# Patient Record
Sex: MALE | Race: WHITE | Employment: FULL TIME | URBAN - METROPOLITAN AREA
[De-identification: names, ages, dates, MRNs, and addresses within clinical notes are randomized per-mention and may not be internally consistent; named-entity substitution may affect disease eponyms.]

---

## 2017-01-16 PROBLEM — Z86.711 HX OF PULMONARY EMBOLUS: Status: ACTIVE | Noted: 2017-01-16

## 2017-01-16 PROBLEM — Z86.718 HISTORY OF DVT OF LOWER EXTREMITY: Status: ACTIVE | Noted: 2017-01-16

## 2017-01-16 PROBLEM — Z98.890 HISTORY OF BACK SURGERY: Status: ACTIVE | Noted: 2017-01-16

## 2017-09-26 PROBLEM — I82.532 CHRONIC DEEP VEIN THROMBOSIS (DVT) OF POPLITEAL VEIN OF LEFT LOWER EXTREMITY (HCC): Status: ACTIVE | Noted: 2017-09-26

## 2018-04-13 ENCOUNTER — HOSPITAL ENCOUNTER (OUTPATIENT)
Age: 58
Discharge: HOME | End: 2018-04-15
Payer: COMMERCIAL

## 2018-04-13 DIAGNOSIS — D50.0 IRON DEFICIENCY ANEMIA DUE TO CHRONIC BLOOD LOSS: ICD-10-CM

## 2018-04-13 LAB
BASOPHILS ABSOLUTE: 0.05 E9/L (ref 0–0.2)
BASOPHILS RELATIVE PERCENT: 1.1 % (ref 0–2)
EOSINOPHILS ABSOLUTE: 0.16 E9/L (ref 0.05–0.5)
EOSINOPHILS RELATIVE PERCENT: 3.6 % (ref 0–6)
FERRITIN: 86 NG/ML
HCT VFR BLD CALC: 43.8 % (ref 37–54)
HEMOGLOBIN: 12.8 G/DL (ref 12.5–16.5)
IMMATURE GRANULOCYTES #: 0.01 E9/L
IMMATURE GRANULOCYTES %: 0.2 % (ref 0–5)
IRON SATURATION: 33 % (ref 20–55)
IRON: 114 MCG/DL (ref 59–158)
LYMPHOCYTES ABSOLUTE: 1.51 E9/L (ref 1.5–4)
LYMPHOCYTES RELATIVE PERCENT: 33.7 % (ref 20–42)
MCH RBC QN AUTO: 27.4 PG (ref 26–35)
MCHC RBC AUTO-ENTMCNC: 29.2 % (ref 32–34.5)
MCV RBC AUTO: 93.8 FL (ref 80–99.9)
MONOCYTES ABSOLUTE: 0.6 E9/L (ref 0.1–0.95)
MONOCYTES RELATIVE PERCENT: 13.4 % (ref 2–12)
NEUTROPHILS ABSOLUTE: 2.15 E9/L (ref 1.8–7.3)
NEUTROPHILS RELATIVE PERCENT: 48 % (ref 43–80)
PDW BLD-RTO: 17.5 FL (ref 11.5–15)
PLATELET # BLD: 205 E9/L (ref 130–450)
PMV BLD AUTO: 11 FL (ref 7–12)
RBC # BLD: 4.67 E12/L (ref 3.8–5.8)
TOTAL IRON BINDING CAPACITY: 344 MCG/DL (ref 250–450)
WBC # BLD: 4.5 E9/L (ref 4.5–11.5)

## 2018-04-13 PROCEDURE — 83540 ASSAY OF IRON: CPT

## 2018-04-13 PROCEDURE — 83550 IRON BINDING TEST: CPT

## 2018-04-13 PROCEDURE — 85025 COMPLETE CBC W/AUTO DIFF WBC: CPT

## 2018-04-13 PROCEDURE — 82728 ASSAY OF FERRITIN: CPT

## 2018-07-20 ENCOUNTER — HOSPITAL ENCOUNTER (OUTPATIENT)
Age: 58
Discharge: HOME | End: 2018-07-22
Payer: COMMERCIAL

## 2018-07-20 DIAGNOSIS — D50.0 IRON DEFICIENCY ANEMIA DUE TO CHRONIC BLOOD LOSS: ICD-10-CM

## 2018-07-20 LAB
ALBUMIN SERPL-MCNC: 4.3 G/DL (ref 3.5–5.2)
ALP BLD-CCNC: 67 U/L (ref 40–129)
ALT SERPL-CCNC: 18 U/L (ref 0–40)
ANION GAP SERPL CALCULATED.3IONS-SCNC: 12 MMOL/L (ref 7–16)
AST SERPL-CCNC: 24 U/L (ref 0–39)
BASOPHILS ABSOLUTE: 0.02 E9/L (ref 0–0.2)
BASOPHILS RELATIVE PERCENT: 0.4 % (ref 0–2)
BILIRUB SERPL-MCNC: 0.4 MG/DL (ref 0–1.2)
BUN BLDV-MCNC: 15 MG/DL (ref 6–20)
CALCIUM SERPL-MCNC: 9.1 MG/DL (ref 8.6–10.2)
CHLORIDE BLD-SCNC: 103 MMOL/L (ref 98–107)
CO2: 27 MMOL/L (ref 22–29)
CREAT SERPL-MCNC: 1 MG/DL (ref 0.7–1.2)
EOSINOPHILS ABSOLUTE: 0.12 E9/L (ref 0.05–0.5)
EOSINOPHILS RELATIVE PERCENT: 2.6 % (ref 0–6)
GFR AFRICAN AMERICAN: >60
GFR NON-AFRICAN AMERICAN: >60 ML/MIN/1.73
GLUCOSE BLD-MCNC: 70 MG/DL (ref 74–109)
HCT VFR BLD CALC: 46.7 % (ref 37–54)
HEMOGLOBIN: 14.5 G/DL (ref 12.5–16.5)
IMMATURE GRANULOCYTES #: 0.01 E9/L
IMMATURE GRANULOCYTES %: 0.2 % (ref 0–5)
LYMPHOCYTES ABSOLUTE: 1.44 E9/L (ref 1.5–4)
LYMPHOCYTES RELATIVE PERCENT: 31.8 % (ref 20–42)
MCH RBC QN AUTO: 28.8 PG (ref 26–35)
MCHC RBC AUTO-ENTMCNC: 31 % (ref 32–34.5)
MCV RBC AUTO: 92.8 FL (ref 80–99.9)
MONOCYTES ABSOLUTE: 0.59 E9/L (ref 0.1–0.95)
MONOCYTES RELATIVE PERCENT: 13 % (ref 2–12)
NEUTROPHILS ABSOLUTE: 2.35 E9/L (ref 1.8–7.3)
NEUTROPHILS RELATIVE PERCENT: 52 % (ref 43–80)
PDW BLD-RTO: 12.7 FL (ref 11.5–15)
PLATELET # BLD: 165 E9/L (ref 130–450)
PMV BLD AUTO: 11.3 FL (ref 7–12)
POTASSIUM SERPL-SCNC: 4.3 MMOL/L (ref 3.5–5)
RBC # BLD: 5.03 E12/L (ref 3.8–5.8)
SODIUM BLD-SCNC: 142 MMOL/L (ref 132–146)
TOTAL PROTEIN: 6.9 G/DL (ref 6.4–8.3)
WBC # BLD: 4.5 E9/L (ref 4.5–11.5)

## 2018-07-20 PROCEDURE — 80053 COMPREHEN METABOLIC PANEL: CPT

## 2018-07-20 PROCEDURE — 85025 COMPLETE CBC W/AUTO DIFF WBC: CPT

## 2018-09-07 ENCOUNTER — HOSPITAL ENCOUNTER (OUTPATIENT)
Dept: ULTRASOUND IMAGING | Age: 58
Discharge: HOME | End: 2018-09-09
Payer: COMMERCIAL

## 2018-09-07 DIAGNOSIS — I82.532 CHRONIC DEEP VEIN THROMBOSIS (DVT) OF POPLITEAL VEIN OF LEFT LOWER EXTREMITY (HCC): ICD-10-CM

## 2018-09-07 PROCEDURE — 93971 EXTREMITY STUDY: CPT

## 2018-10-26 ENCOUNTER — HOSPITAL ENCOUNTER (OUTPATIENT)
Age: 58
Discharge: HOME | End: 2018-10-28
Payer: COMMERCIAL

## 2018-10-26 DIAGNOSIS — D50.0 IRON DEFICIENCY ANEMIA DUE TO CHRONIC BLOOD LOSS: ICD-10-CM

## 2018-10-26 LAB
BASOPHILS ABSOLUTE: 0.03 E9/L (ref 0–0.2)
BASOPHILS RELATIVE PERCENT: 0.6 % (ref 0–2)
EOSINOPHILS ABSOLUTE: 0.11 E9/L (ref 0.05–0.5)
EOSINOPHILS RELATIVE PERCENT: 2.4 % (ref 0–6)
HCT VFR BLD CALC: 46.1 % (ref 37–54)
HEMOGLOBIN: 14.1 G/DL (ref 12.5–16.5)
IMMATURE GRANULOCYTES #: 0.01 E9/L
IMMATURE GRANULOCYTES %: 0.2 % (ref 0–5)
LYMPHOCYTES ABSOLUTE: 1.51 E9/L (ref 1.5–4)
LYMPHOCYTES RELATIVE PERCENT: 32.3 % (ref 20–42)
MCH RBC QN AUTO: 27.9 PG (ref 26–35)
MCHC RBC AUTO-ENTMCNC: 30.6 % (ref 32–34.5)
MCV RBC AUTO: 91.3 FL (ref 80–99.9)
MONOCYTES ABSOLUTE: 0.64 E9/L (ref 0.1–0.95)
MONOCYTES RELATIVE PERCENT: 13.7 % (ref 2–12)
NEUTROPHILS ABSOLUTE: 2.37 E9/L (ref 1.8–7.3)
NEUTROPHILS RELATIVE PERCENT: 50.8 % (ref 43–80)
PDW BLD-RTO: 12.8 FL (ref 11.5–15)
PLATELET # BLD: 177 E9/L (ref 130–450)
PMV BLD AUTO: 11.6 FL (ref 7–12)
RBC # BLD: 5.05 E12/L (ref 3.8–5.8)
WBC # BLD: 4.7 E9/L (ref 4.5–11.5)

## 2018-10-26 PROCEDURE — 85025 COMPLETE CBC W/AUTO DIFF WBC: CPT

## 2019-01-08 ENCOUNTER — HOSPITAL ENCOUNTER (OUTPATIENT)
Age: 59
Discharge: HOME | End: 2019-01-08
Payer: COMMERCIAL

## 2019-01-08 DIAGNOSIS — K62.5 RECTAL BLEEDING: ICD-10-CM

## 2019-01-08 LAB
BASOPHILS ABSOLUTE: 0.03 E9/L (ref 0–0.2)
BASOPHILS RELATIVE PERCENT: 0.8 % (ref 0–2)
EOSINOPHILS ABSOLUTE: 0.1 E9/L (ref 0.05–0.5)
EOSINOPHILS RELATIVE PERCENT: 2.5 % (ref 0–6)
HCT VFR BLD CALC: 39.4 % (ref 37–54)
HEMOGLOBIN: 12.4 G/DL (ref 12.5–16.5)
IMMATURE GRANULOCYTES #: 0.02 E9/L
IMMATURE GRANULOCYTES %: 0.5 % (ref 0–5)
LYMPHOCYTES ABSOLUTE: 1.23 E9/L (ref 1.5–4)
LYMPHOCYTES RELATIVE PERCENT: 30.9 % (ref 20–42)
MCH RBC QN AUTO: 27.4 PG (ref 26–35)
MCHC RBC AUTO-ENTMCNC: 31.5 % (ref 32–34.5)
MCV RBC AUTO: 87.2 FL (ref 80–99.9)
MONOCYTES ABSOLUTE: 0.59 E9/L (ref 0.1–0.95)
MONOCYTES RELATIVE PERCENT: 14.8 % (ref 2–12)
NEUTROPHILS ABSOLUTE: 2.01 E9/L (ref 1.8–7.3)
NEUTROPHILS RELATIVE PERCENT: 50.5 % (ref 43–80)
PDW BLD-RTO: 14.1 FL (ref 11.5–15)
PLATELET # BLD: 216 E9/L (ref 130–450)
PMV BLD AUTO: 10.8 FL (ref 7–12)
RBC # BLD: 4.52 E12/L (ref 3.8–5.8)
WBC # BLD: 4 E9/L (ref 4.5–11.5)

## 2019-01-08 PROCEDURE — 36415 COLL VENOUS BLD VENIPUNCTURE: CPT

## 2019-01-08 PROCEDURE — 85025 COMPLETE CBC W/AUTO DIFF WBC: CPT

## 2019-03-05 ENCOUNTER — OFFICE VISIT (OUTPATIENT)
Dept: VASCULAR SURGERY | Age: 59
End: 2019-03-05
Payer: COMMERCIAL

## 2019-03-05 ENCOUNTER — HOSPITAL ENCOUNTER (OUTPATIENT)
Dept: CARDIOLOGY | Age: 59
Discharge: HOME | End: 2019-03-05
Payer: COMMERCIAL

## 2019-03-05 DIAGNOSIS — I72.8 ANEURYSM OF CELIAC ARTERY (HCC): Primary | ICD-10-CM

## 2019-03-05 DIAGNOSIS — I72.8 ANEURYSM OF CELIAC ARTERY (HCC): ICD-10-CM

## 2019-03-05 PROCEDURE — 93978 VASCULAR STUDY: CPT

## 2019-03-05 PROCEDURE — 99213 OFFICE O/P EST LOW 20 MIN: CPT | Performed by: SURGERY

## 2020-08-11 ENCOUNTER — OFFICE VISIT (OUTPATIENT)
Dept: VASCULAR SURGERY | Age: 60
End: 2020-08-11
Payer: COMMERCIAL

## 2020-08-11 ENCOUNTER — HOSPITAL ENCOUNTER (OUTPATIENT)
Dept: CARDIOLOGY | Age: 60
Discharge: HOME | End: 2020-08-11
Payer: COMMERCIAL

## 2020-08-11 VITALS — BODY MASS INDEX: 25.73 KG/M2 | RESPIRATION RATE: 16 BRPM | HEIGHT: 72 IN | WEIGHT: 190 LBS

## 2020-08-11 PROCEDURE — 93978 VASCULAR STUDY: CPT

## 2020-08-11 PROCEDURE — 99213 OFFICE O/P EST LOW 20 MIN: CPT | Performed by: PHYSICIAN ASSISTANT

## 2020-08-11 RX ORDER — LATANOPROST 50 UG/ML
1 SOLUTION/ DROPS OPHTHALMIC NIGHTLY
COMMUNITY
End: 2021-06-28

## 2020-08-11 NOTE — PROGRESS NOTES
Vascular Surgery Outpatient Progress Note      Chief Complaint   Patient presents with    Follow-up     AAA       HISTORY OF PRESENT ILLNESS:                The patient is a 61 y.o. 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 He denies any recent hospital admission, major illness, or surgery since the last office visit. Denies new abd pain or back pain. He does have chronic back pain. 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 He states he has chronic L leg swelling with mild discomfort when he is on his feet for a long period of time. He does not wear compression stockings as they are uncomfortable and he states it is not bothersome enough to wear them at this point. Past Medical History:        Diagnosis Date    Blood clot in vein     Celiac artery aneurysm (HCC)     Leg pain     Pulmonary embolism (HCC)      Past Surgical History:        Procedure Laterality Date    BACK SURGERY      COLONOSCOPY  03/09/2015    NORMAL- JEROMY    FINGER SURGERY       Current Medications:   Prior to Admission medications    Medication Sig Start Date End Date Taking? Authorizing Provider   latanoprost (XALATAN) 0.005 % ophthalmic solution 1 drop nightly   Yes Historical Provider, MD   hydrocortisone (ANUSOL-HC) 2.5 % rectal cream Place rectally 2 times daily. 12/16/19  Yes Murray Cunningham MD   ferrous sulfate 325 (65 Fe) MG tablet Take 325 mg by mouth as needed    Yes Historical Provider, MD   docusate sodium (COLACE) 100 MG capsule Take 100 mg by mouth 2 times daily   Yes Historical Provider, MD   Psyllium (METAMUCIL PO) Take 1 tablet by mouth as needed. Yes Historical Provider, MD   rivaroxaban (XARELTO) 20 MG TABS tablet Take 20 mg by mouth daily (with breakfast)    Historical Provider, MD     Allergies:  Patient has no known allergies.     Social History     Socioeconomic History    Marital status:      Spouse name: Not on file    Number of children: Not on file    Years of education: Not on file    Highest education level: Not on file   Occupational History    Not on file   Social Needs    Financial resource strain: Not on file    Food insecurity     Worry: Not on file     Inability: Not on file    Transportation needs     Medical: Not on file     Non-medical: Not on file   Tobacco Use    Smoking status: Never Smoker    Smokeless tobacco: Never Used   Substance and Sexual Activity    Alcohol use: Yes     Comment: rarely drinks wine/ 2-3 cups of coffee daily    Drug use: No    Sexual activity: Yes     Partners: Female   Lifestyle    Physical activity     Days per week: Not on file     Minutes per session: Not on file    Stress: Not on file   Relationships    Social connections     Talks on phone: Not on file     Gets together: Not on file     Attends Quaker service: Not on file     Active member of club or organization: Not on file     Attends meetings of clubs or organizations: Not on file     Relationship status: Not on file    Intimate partner violence     Fear of current or ex partner: Not on file     Emotionally abused: Not on file     Physically abused: Not on file     Forced sexual activity: Not on file   Other Topics Concern    Not on file   Social History Narrative    Not on file        Family History   Problem Relation Age of Onset    Other Mother     Heart Disease Father        REVIEW OF SYSTEMS (New symptoms):    Eyes:      Blurred vision:  No [x]/Yes []               Diplopia:   No [x]/Yes []               Vision loss:       No [x]/Yes []   Ears, nose, throat:             Hearing loss:    No [x]/Yes []      Vertigo:   No [x]/Yes []                       Swallowing problem:  No [x]/Yes []               Nose bleeds:   No [x]/Yes []      Voice hoarseness:  No [x]/Yes []  Respiratory:             Cough:   No [x]/Yes []      Pleuritic chest pain:  No [x]/Yes []                        Dyspnea:   No [x]/Yes []      Wheezing: leg edema bilaterally    PULSE EXAM      Right      Left   Brachial     Radial 3 3   Femoral     Popliteal     Dorsalis Pedis     Posterior Tibial 3 3   (3=normal, 2=diminished, 1=barely palpable, 4=widened)    RADIOLOGY: [facility] today    Celiac artery 2.0 cm  Distal Abdominal Aorta: 2.3 cm    Problem List Items Addressed This Visit        Vascular Problems    Aneurysm of celiac artery (HCC) - Primary    Relevant Orders    US DUPLEX SCAN OF AORTA    Chronic deep vein thrombosis (DVT) of popliteal vein of left lower extremity ([address]        I reviewed the results of the ultrasound with the patient. Stable celiac artery aneurysm. No need for surgical intervention at this time. I asked him to remain on xarelto and to take it with food. I reviewed that the patient can continue with all normal activities. I reviewed s/s of rupture and instructions should this occur. I will plan to see him back in one year with another ultrasound. Pt seen and plan reviewed with Dr. Sheela Severs. Maria A Mason PA-C    Return in about 1 year (around 8/11/2021).

## 2021-06-28 DIAGNOSIS — Z00.00 ROUTINE GENERAL MEDICAL EXAMINATION AT A HEALTH CARE FACILITY: ICD-10-CM

## 2021-06-28 LAB
ALBUMIN SERPL-MCNC: 4.2 G/DL (ref 3.5–5.2)
ALP BLD-CCNC: 72 U/L (ref 40–129)
ALT SERPL-CCNC: 16 U/L (ref 0–40)
ANION GAP SERPL CALCULATED.3IONS-SCNC: 10 MMOL/L (ref 7–16)
AST SERPL-CCNC: 23 U/L (ref 0–39)
BASOPHILS ABSOLUTE: 0.03 E9/L (ref 0–0.2)
BASOPHILS RELATIVE PERCENT: 0.7 % (ref 0–2)
BILIRUB SERPL-MCNC: 0.7 MG/DL (ref 0–1.2)
BUN BLDV-MCNC: 16 MG/DL (ref 6–23)
CALCIUM SERPL-MCNC: 9.1 MG/DL (ref 8.6–10.2)
CHLORIDE BLD-SCNC: 101 MMOL/L (ref 98–107)
CHOLESTEROL, TOTAL: 179 MG/DL (ref 0–199)
CO2: 27 MMOL/L (ref 22–29)
CREAT SERPL-MCNC: 1.1 MG/DL (ref 0.7–1.2)
EOSINOPHILS ABSOLUTE: 0.08 E9/L (ref 0.05–0.5)
EOSINOPHILS RELATIVE PERCENT: 2 % (ref 0–6)
GFR AFRICAN AMERICAN: >60
GFR NON-AFRICAN AMERICAN: >60 ML/MIN/1.73
GLUCOSE BLD-MCNC: 91 MG/DL (ref 74–99)
HCT VFR BLD CALC: 49.3 % (ref 37–54)
HDLC SERPL-MCNC: 44 MG/DL
HEMOGLOBIN: 16.1 G/DL (ref 12.5–16.5)
IMMATURE GRANULOCYTES #: 0.01 E9/L
IMMATURE GRANULOCYTES %: 0.2 % (ref 0–5)
LDL CHOLESTEROL CALCULATED: 120 MG/DL (ref 0–99)
LYMPHOCYTES ABSOLUTE: 1.37 E9/L (ref 1.5–4)
LYMPHOCYTES RELATIVE PERCENT: 33.9 % (ref 20–42)
MCH RBC QN AUTO: 30.7 PG (ref 26–35)
MCHC RBC AUTO-ENTMCNC: 32.7 % (ref 32–34.5)
MCV RBC AUTO: 94.1 FL (ref 80–99.9)
MONOCYTES ABSOLUTE: 0.48 E9/L (ref 0.1–0.95)
MONOCYTES RELATIVE PERCENT: 11.9 % (ref 2–12)
NEUTROPHILS ABSOLUTE: 2.07 E9/L (ref 1.8–7.3)
NEUTROPHILS RELATIVE PERCENT: 51.3 % (ref 43–80)
PDW BLD-RTO: 12.8 FL (ref 11.5–15)
PLATELET # BLD: 154 E9/L (ref 130–450)
PMV BLD AUTO: 11.3 FL (ref 7–12)
POTASSIUM SERPL-SCNC: 4.1 MMOL/L (ref 3.5–5)
PROSTATE SPECIFIC ANTIGEN: 1.95 NG/ML (ref 0–4)
RBC # BLD: 5.24 E12/L (ref 3.8–5.8)
SODIUM BLD-SCNC: 138 MMOL/L (ref 132–146)
TOTAL PROTEIN: 7 G/DL (ref 6.4–8.3)
TRIGL SERPL-MCNC: 73 MG/DL (ref 0–149)
VLDLC SERPL CALC-MCNC: 15 MG/DL
WBC # BLD: 4 E9/L (ref 4.5–11.5)

## 2021-06-29 LAB — HEPATITIS C ANTIBODY INTERPRETATION: NORMAL

## 2021-08-12 ENCOUNTER — TELEPHONE (OUTPATIENT)
Dept: VASCULAR SURGERY | Age: 61
End: 2021-08-12

## 2021-08-12 DIAGNOSIS — I72.8 ANEURYSM OF CELIAC ARTERY (HCC): Primary | ICD-10-CM

## 2021-08-12 NOTE — TELEPHONE ENCOUNTER
Scheduled abd u/s at [facility] 8/30 at 4:30 pm, ov with Dr. Arya Del Castillo 8/31 at 4:00 pm, message left on pt's voicemail along with instructions to be NPO x 6-8 hours prior to u/s.

## 2021-08-30 ENCOUNTER — TELEPHONE (OUTPATIENT)
Dept: VASCULAR SURGERY | Age: 61
End: 2021-08-30

## 2021-09-10 ENCOUNTER — HOSPITAL ENCOUNTER (OUTPATIENT)
Dept: ULTRASOUND IMAGING | Age: 61
Discharge: HOME | End: 2021-09-12
Payer: COMMERCIAL

## 2021-09-10 DIAGNOSIS — I72.8 ANEURYSM OF CELIAC ARTERY (HCC): ICD-10-CM

## 2021-09-10 PROCEDURE — 76705 ECHO EXAM OF ABDOMEN: CPT | Performed by: RADIOLOGY

## 2021-09-10 PROCEDURE — 76705 ECHO EXAM OF ABDOMEN: CPT

## 2021-09-27 ENCOUNTER — TELEPHONE (OUTPATIENT)
Dept: VASCULAR SURGERY | Age: 61
End: 2021-09-27

## 2021-09-28 ENCOUNTER — OFFICE VISIT (OUTPATIENT)
Dept: VASCULAR SURGERY | Age: 61
End: 2021-09-28
Payer: COMMERCIAL

## 2021-09-28 DIAGNOSIS — I72.8 CELIAC ARTERY ANEURYSM (HCC): Primary | ICD-10-CM

## 2021-09-28 PROCEDURE — 99212 OFFICE O/P EST SF 10 MIN: CPT | Performed by: NURSE PRACTITIONER

## 2021-09-28 RX ORDER — ACETAMINOPHEN 500 MG
500 TABLET ORAL EVERY 6 HOURS PRN
COMMUNITY

## 2021-09-28 NOTE — PROGRESS NOTES
Vascular Surgery Outpatient Progress Note      Chief Complaint   Patient presents with    Circulatory Problem     Follow up celiac artery aneurysm. HISTORY OF PRESENT ILLNESS:                The patient is a 64 y.o. 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 Denies new abd pain or back pain. He does have chronic back pain. 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 He states he has chronic L leg swelling with mild discomfort when he is on his feet for a long period of time. He does not wear compression stockings as they are uncomfortable and he states it is not bothersome enough to wear them at this point. He denies any recent hospital admission, major illness, or surgery since the last office visit. Past Medical History:        Diagnosis Date    Blood clot in vein     Celiac artery aneurysm (HCC)     Leg pain     Pulmonary embolism (HCC)     TMJ (dislocation of temporomandibular joint)      Past Surgical History:        Procedure Laterality Date    BACK SURGERY      COLONOSCOPY  03/09/2015    NORMAL- JEROMY    FINGER SURGERY       Current Medications:   Prior to Admission medications    Medication Sig Start Date End Date Taking? Authorizing Provider   acetaminophen (TYLENOL) 500 MG tablet Take 500 mg by mouth every 6 hours as needed for Pain   Yes Historical Provider, MD   rivaroxaban (XARELTO) 20 MG TABS tablet Take 1 tablet by mouth daily (with breakfast) 9/8/21  Yes Royetta Boas, MD   Psyllium (METAMUCIL PO) Take 1 tablet by mouth as needed. Yes Historical Provider, MD     Allergies:  Patient has no known allergies.     Social History     Socioeconomic History    Marital status:      Spouse name: Not on file    Number of children: Not on file    Years of education: Not on file    Highest education level: Not on file   Occupational History    Not on file   Tobacco Use    Smoking status: Never Smoker    Smokeless tobacco: Never Used   Vaping Use    Vaping Use: Never used   Substance and Sexual Activity    Alcohol use: Yes     Comment: rarely drinks wine/ 2-3 cups of coffee daily    Drug use: No    Sexual activity: Yes     Partners: Female   Other Topics Concern    Not on file   Social History Narrative    Not on file     Social Determinants of Health     Financial Resource Strain: Low Risk     Difficulty of Paying Living Expenses: Not hard at all   Food Insecurity: No Food Insecurity    Worried About Running Out of Food in the Last Year: Never true    [address] in the Last Year: Never true   Transportation Needs:     Lack of Transportation (Medical):      Lack of Transportation (Non-Medical):    Physical Activity:     Days of Exercise per Week:     Minutes of Exercise per Session:    Stress:     Feeling of Stress :    Social Connections:     Frequency of Communication with Friends and Family:     Frequency of Social Gatherings with Friends and Family:     Attends Jain Services:     Active Member of Clubs or Organizations:     Attends Club or Organization Meetings:     Marital Status:    Intimate Partner Violence:     Fear of Current or Ex-Partner:     Emotionally Abused:     Physically Abused:     Sexually Abused:         Family History   Problem Relation Age of Onset    Other Mother     Heart Disease Father        REVIEW OF SYSTEMS (New symptoms):    Eyes:      Blurred vision:  No [x]/Yes []               Diplopia:   No [x]/Yes []               Vision loss:       No [x]/Yes []   Ears, nose, throat:             Hearing loss:    No [x]/Yes []      Vertigo:   No [x]/Yes []                       Swallowing problem:  No [x]/Yes []               Nose bleeds:   No [x]/Yes []      Voice hoarseness:  No [x]/Yes []  Respiratory:             Cough:   No [x]/Yes []      Pleuritic chest pain:  No [x]/Yes []                        Dyspnea:   No [x]/Yes []      Wheezing:   No [x]/Yes []  Cardiovascular: Angina:   No [x]/Yes []      Palpitations:   No [x]/Yes []          Claudication:    No [x]/Yes []      Leg swelling:   No []/Yes [x]  Gastrointestinal:             Nausea or vomiting:  No [x]/Yes []               Abdominal pain:  No [x]/Yes []                     Intestinal bleeding: No [x]/Yes []  Musculoskeletal:             Leg pain:   No []/Yes [x]      Back pain:   No []/Yes [x]                    Weakness:   No [x]/Yes []  Neurologic:             Numbness:   No [x]/Yes []      Paralysis:   No [x]/Yes []                       Headaches:   No [x]/Yes []  Hematologic, lymphatic:   Anemia:   No [x]/Yes []              Bleeding or bruising:  No [x]/Yes []              Fevers or chills: No [x]/Yes []  Endocrine:             Temp intolerance:   No [x]/Yes []                       Polydipsia, polyuria:  No [x]/Yes []  Skin:              Rash:    No [x]/Yes []      Ulcers:   No [x]/Yes []              Abnorm pigment: No [x]/Yes []  :              Frequency/urgency:  No [x]/Yes []      Hematuria:    No [x]/Yes []                      Incontinence:    No [x]/Yes []    PHYSICAL EXAM:  There were no vitals filed for this visit.   General Appearance: alert and oriented to person, place and time, well developed and well- nourished, in no acute distress  Skin: warm and dry, no rash or erythema  Head: normocephalic and atraumatic  Eyes: extraocular eye movements intact, conjunctivae normal  ENT: external ears normal bilaterally, nose without deformity  Pulmonary/Chest: clear to auscultation bilaterally- no wheezes, rales or rhonchi, normal air movement, no respiratory distress  Cardiovascular: normal rate, regular rhythm, normal S1 and S2, no murmurs, no carotid bruits  Abdomen: soft, non-tender, non-distended  Musculoskeletal: normal range of motion, no joint swelling, deformity or tenderness  Neurologic: no cranial nerve deficit, gait, coordination and speech normal  Extremities: + leg edema left    PULSE EXAM Right      Left   Brachial     Radial 3 3   Femoral     Popliteal     Dorsalis Pedis     Posterior Tibial 3 3   (3=normal, 2=diminished, 1=barely palpable, 4=widened)    RADIOLOGY:  Abdominal Ultrasound:   Celiac artery 2.0 cm    Problem List Items Addressed This Visit     None      Visit Diagnoses     Celiac artery aneurysm (HCC)    -  Primary    Relevant Orders    US ABDOMINAL AORTA LIMITED        I reviewed the results of the ultrasound with the patient. Stable celiac artery aneurysm. No need for surgical intervention at this time. I asked him to remain on xarelto and to take it with food. I reviewed that the patient can continue with all normal activities. I reviewed s/s of rupture and instructions should this occur. I will plan to see him back in one year with another ultrasound. Pt seen and plan reviewed with Dr. Gregory Washington. SATISH Patrick - CNP    Return in about 1 year (around 9/28/2022).

## 2021-10-14 ENCOUNTER — HOSPITAL ENCOUNTER (OUTPATIENT)
Age: 61
Discharge: HOME | End: 2021-10-16
Payer: COMMERCIAL

## 2021-10-14 ENCOUNTER — HOSPITAL ENCOUNTER (OUTPATIENT)
Dept: MRI IMAGING | Age: 61
Discharge: HOME | End: 2021-10-16
Payer: COMMERCIAL

## 2021-10-14 DIAGNOSIS — M54.16 LUMBAR BACK PAIN WITH RADICULOPATHY AFFECTING LEFT LOWER EXTREMITY: ICD-10-CM

## 2021-10-14 PROCEDURE — 72148 MRI LUMBAR SPINE W/O DYE: CPT

## 2021-10-20 ENCOUNTER — OFFICE VISIT (OUTPATIENT)
Dept: NEUROSURGERY | Age: 61
End: 2021-10-20
Payer: COMMERCIAL

## 2021-10-20 VITALS
HEART RATE: 71 BPM | OXYGEN SATURATION: 98 % | SYSTOLIC BLOOD PRESSURE: 140 MMHG | TEMPERATURE: 97.2 F | WEIGHT: 185 LBS | HEIGHT: 71 IN | DIASTOLIC BLOOD PRESSURE: 88 MMHG | BODY MASS INDEX: 25.9 KG/M2 | RESPIRATION RATE: 20 BRPM

## 2021-10-20 DIAGNOSIS — M54.16 LUMBAR RADICULOPATHY: Primary | ICD-10-CM

## 2021-10-20 PROCEDURE — 99203 OFFICE O/P NEW LOW 30 MIN: CPT

## 2021-10-20 ASSESSMENT — ENCOUNTER SYMPTOMS
SHORTNESS OF BREATH: 0
BACK PAIN: 0
VOMITING: 0
NAUSEA: 1
TROUBLE SWALLOWING: 0
ABDOMINAL DISTENTION: 0

## 2021-10-20 NOTE — PATIENT INSTRUCTIONS

## 2021-10-20 NOTE — PROGRESS NOTES
Subjective:      Patient ID: Fabiana Yi is a 64 y.o. male. Pt seen and evaluated in neurosurgery clinic for back pain. He states that 2 weeks ago he was bending over and had a sudden onset of back pain. He was seen by his PCP who prescribed steroids and muscle relaxers but it's not helping. He also reports he has been taking Tylenol for the pain. He reports the pain is in the lumbar spine, slightly to the left of midline and radiating to the left thigh above the knee. Standing up and walking around makes the pain better. Lying down makes the pain worse. He reports the pain feels like aching. He also reports numbness and a dull pain around his patella. He states the pain radiates down the left leg to the thigh right above the knee. He reports his pain is currently a 2/10, but has gotten as high as 9/10. Pt states he has had no PT or ERIN for these symptoms. He has had a right hemilaminectomy back in 1998 by Dr. Alejandro Ledezma. He is currently on Xarelto for chronic DVT management. He denies any tobacco usage at this time. Review of Systems   Constitutional: Negative for fever. HENT: Negative for trouble swallowing. Eyes: Negative for visual disturbance. Respiratory: Negative for shortness of breath. Cardiovascular: Negative for chest pain. Gastrointestinal: Positive for nausea. Negative for abdominal distention and vomiting. Endocrine: Negative for polyuria. Genitourinary: Negative for dysuria. Musculoskeletal: Negative for back pain and neck pain. Skin: Negative for rash. Neurological: Negative for facial asymmetry and numbness. Psychiatric/Behavioral: Negative for confusion. Objective:   Physical Exam  Constitutional:       Appearance: Normal appearance. HENT:      Head: Normocephalic and atraumatic. Eyes:      Extraocular Movements: Extraocular movements intact. Conjunctiva/sclera: Conjunctivae normal.      Pupils: Pupils are equal, round, and reactive to light. Cardiovascular:      Rate and Rhythm: Normal rate. Pulmonary:      Effort: Pulmonary effort is normal.      Breath sounds: Normal breath sounds. Abdominal:      General: Abdomen is flat. There is no distension. Palpations: Abdomen is soft. Musculoskeletal:         General: Normal range of motion. Cervical back: Normal range of motion and neck supple. Skin:     General: Skin is warm and dry. Neurological:      Mental Status: He is alert. Comments: Alert and oriented x3  CN 3-12 intact  Motor strength 4/5 hip flexion on LLE. 5/5 in all other exams  Sensation reduced on left thigh  Reflexes normal   Psychiatric:         Mood and Affect: Mood normal.         Thought Content: Thought content normal.         Assessment:      65 y/o male with previous L5-S1 right hemilaminectomy performed by Dr. Kinga Edmonds being seen for new left sided back pain and radiculopathy. Pt has been treated by PCP with muscle relaxers and medrol dose pack. Lumbar MRI completed.       Plan:      -Refer to PT and pain management  -Continue muscle relaxer as prescribed  -Return to clinic if conservative therapy fails        Sherryle Michael, [place]

## 2022-09-19 DIAGNOSIS — I72.8 CELIAC ARTERY ANEURYSM (HCC): Primary | ICD-10-CM

## 2022-09-20 ENCOUNTER — TELEPHONE (OUTPATIENT)
Dept: VASCULAR SURGERY | Age: 62
End: 2022-09-20

## 2022-09-20 NOTE — TELEPHONE ENCOUNTER
Left message to call to schedule aorta testing that is due prior to appt on 10-4-2022 with Dr Chay Jim.

## 2022-09-22 ENCOUNTER — TELEPHONE (OUTPATIENT)
Dept: VASCULAR SURGERY | Age: 62
End: 2022-09-22

## 2022-09-22 DIAGNOSIS — I72.8 CELIAC ARTERY ANEURYSM (HCC): Primary | ICD-10-CM

## 2022-09-22 NOTE — TELEPHONE ENCOUNTER
Scheduled u/s for celiac artery aneurysm at [facility] 10/5 at 7:30 a.m, message left on pt's voicemail to report at 7:00 a.m and to be NPO after midnight. Moved appt with Dr. Live Kaur to 10/11 at 9:00 a.m.

## 2022-10-05 ENCOUNTER — HOSPITAL ENCOUNTER (OUTPATIENT)
Dept: ULTRASOUND IMAGING | Age: 62
Discharge: HOME | End: 2022-10-07
Payer: COMMERCIAL

## 2022-10-05 DIAGNOSIS — I72.8 CELIAC ARTERY ANEURYSM (HCC): ICD-10-CM

## 2022-10-05 PROCEDURE — 76705 ECHO EXAM OF ABDOMEN: CPT

## 2022-10-11 ENCOUNTER — OFFICE VISIT (OUTPATIENT)
Dept: VASCULAR SURGERY | Age: 62
End: 2022-10-11
Payer: COMMERCIAL

## 2022-10-11 VITALS — WEIGHT: 185 LBS | BODY MASS INDEX: 25.9 KG/M2 | HEIGHT: 71 IN

## 2022-10-11 DIAGNOSIS — I72.8 CELIAC ARTERY ANEURYSM (HCC): Primary | ICD-10-CM

## 2022-10-11 PROCEDURE — 99213 OFFICE O/P EST LOW 20 MIN: CPT | Performed by: SURGERY

## 2022-10-11 NOTE — PROGRESS NOTES
Vascular Surgery Outpatient Progress Note      Chief Complaint   Patient presents with    Check-Up     Celiac artery aneurysm       HISTORY OF PRESENT ILLNESS:                The patient is a 58 y.o. male who returns for follow-up evaluation of celiac artery aneurysm. The patient denies any problems since I saw him last year. He complains of occasional indigestion but denies any new abdominal or chest pain. Past Medical History:        Diagnosis Date    Blood clot in vein     Celiac artery aneurysm (HCC)     Leg pain     Pulmonary embolism (HCC)     TMJ (dislocation of temporomandibular joint)      Past Surgical History:        Procedure Laterality Date    BACK SURGERY      COLONOSCOPY  03/09/2015    NORMAL- JEROMY    FINGER SURGERY       Current Medications:   Prior to Admission medications    Medication Sig Start Date End Date Taking? Authorizing Provider   tiZANidine (ZANAFLEX) 2 MG tablet Take 1 tablet by mouth 3 times daily as needed (pain) 10/8/21  Yes Durga Alicia MD   methylPREDNISolone (MEDROL DOSEPACK) 4 MG tablet Take by mouth, as directed. 10/5/21  Yes Durga Alicia MD   acetaminophen (TYLENOL) 500 MG tablet Take 500 mg by mouth every 6 hours as needed for Pain   Yes Historical Provider, MD   rivaroxaban (XARELTO) 20 MG TABS tablet Take 1 tablet by mouth daily (with breakfast) 9/8/21  Yes Durga Alicia MD   Psyllium (METAMUCIL PO) Take 1 tablet by mouth as needed. Yes Historical Provider, MD     Allergies:  Patient has no known allergies.     Social History     Socioeconomic History    Marital status:      Spouse name: Not on file    Number of children: Not on file    Years of education: Not on file    Highest education level: Not on file   Occupational History    Not on file   Tobacco Use    Smoking status: Never    Smokeless tobacco: Never   Vaping Use    Vaping Use: Never used   Substance and Sexual Activity    Alcohol use: Yes     Comment: rarely drinks wine/ 2-3 cups of coffee daily Drug use: No    Sexual activity: Yes     Partners: Female   Other Topics Concern    Not on file   Social History Narrative    Not on file     Social Determinants of Health     Financial Resource Strain: Not on file   Food Insecurity: Not on file   Transportation Needs: Not on file   Physical Activity: Not on file   Stress: Not on file   Social Connections: Not on file   Intimate Partner Violence: Not on file   Housing Stability: Not on file        Family History   Problem Relation Age of Onset    Other Mother     Heart Disease Father        REVIEW OF SYSTEMS (New symptoms):    Eyes:      Blurred vision:  No [x]/Yes []               Diplopia:   No [x]/Yes []               Vision loss:       No [x]/Yes []   Ears, nose, throat:             Hearing loss:    No [x]/Yes []      Vertigo:   No [x]/Yes []                       Swallowing problem:  No [x]/Yes []               Nose bleeds:   No [x]/Yes []      Voice hoarseness:  No [x]/Yes []  Respiratory:             Cough:   No [x]/Yes []      Pleuritic chest pain:  No [x]/Yes []                        Dyspnea:   No [x]/Yes []      Wheezing:   No [x]/Yes []  Cardiovascular:             Angina:   No [x]/Yes []      Palpitations:   No [x]/Yes []          Claudication:    No [x]/Yes []      Leg swelling:   No [x]/Yes []  Gastrointestinal:             Nausea or vomiting:  No [x]/Yes []               Abdominal pain:  No [x]/Yes []                     Intestinal bleeding: No [x]/Yes []  Musculoskeletal:             Leg pain:   No [x]/Yes []      Back pain:   No [x]/Yes []                    Weakness:   No [x]/Yes []  Neurologic:             Numbness:   No [x]/Yes []      Paralysis:   No [x]/Yes []                       Headaches:   No [x]/Yes []  Hematologic, lymphatic:   Anemia:   No [x]/Yes []              Bleeding or bruising:  No [x]/Yes []              Fevers or chills: No [x]/Yes []  Endocrine:             Temp intolerance:   No [x]/Yes [] Polydipsia, polyuria:  No [x]/Yes []  Skin:              Rash:    No [x]/Yes []      Ulcers:   No [x]/Yes []              Abnorm pigment: No [x]/Yes []  :              Frequency/urgency:  No [x]/Yes []      Hematuria:    No [x]/Yes []                      Incontinence:    No [x]/Yes []    PHYSICAL EXAM:  There were no vitals filed for this visit. General Appearance: alert and oriented to person, place and time, in no acute distress, well developed and well- nourished  Neurologic: no cranial nerve deficit, speech normal  Head: normocephalic and atraumatic  Eyes: extraocular eye movements intact, conjunctivae normal  ENT: external ear and ear canal normal bilaterally, nose without deformity, no carotid bruits  Pulmonary/Chest: normal air movement, no respiratory distress  Cardiovascular: normal rate, regular rhythm, no murmur  Abdomen: non-distended, no masses  Musculoskeletal: no joint deformity or tenderness  Extremities: no leg edema bilaterally  Skin: warm and dry, no rash or erythema    PULSE EXAM      Right      Left   Brachial     Radial 3 3   Femoral     Popliteal     Dorsalis Pedis     Posterior Tibial     (3=normal, 2=diminished, 1=barely palpable, 4=widened)    RADIOLOGY: [facility] today    Problem List Items Addressed This Visit    None  Visit Diagnoses       Celiac artery aneurysm (HCC)    -  Primary    Relevant Orders    US ABDOMEN LIMITED            I reviewed the ultrasound that measures his celiac artery aneurysm at 2 cm. This is not changed from his study last year. I reviewed with the patient that from my standpoint he can continue with all his normal activities. I will plan to see him again in 2 years with a repeat ultrasound but asked him to call sooner with any changes or new problems. Return in about 2 years (around 10/11/2024).

## 2024-09-20 ENCOUNTER — TELEPHONE (OUTPATIENT)
Dept: VASCULAR SURGERY | Age: 64
End: 2024-09-20

## 2024-09-20 DIAGNOSIS — I72.8 CELIAC ARTERY ANEURYSM (HCC): Primary | ICD-10-CM

## 2024-10-04 ENCOUNTER — HOSPITAL ENCOUNTER (OUTPATIENT)
Dept: ULTRASOUND IMAGING | Age: 64
Discharge: HOME | End: 2024-10-06
Attending: SURGERY
Payer: COMMERCIAL

## 2024-10-04 DIAGNOSIS — I72.8 CELIAC ARTERY ANEURYSM (HCC): ICD-10-CM

## 2024-10-04 PROCEDURE — 76705 ECHO EXAM OF ABDOMEN: CPT

## 2024-10-08 ENCOUNTER — OFFICE VISIT (OUTPATIENT)
Dept: VASCULAR SURGERY | Age: 64
End: 2024-10-08
Payer: COMMERCIAL

## 2024-10-08 DIAGNOSIS — I72.8 CELIAC ARTERY ANEURYSM (HCC): Primary | ICD-10-CM

## 2024-10-08 PROCEDURE — 99213 OFFICE O/P EST LOW 20 MIN: CPT | Performed by: SURGERY

## 2024-10-08 RX ORDER — ATORVASTATIN CALCIUM 20 MG/1
20 TABLET, FILM COATED ORAL DAILY
COMMUNITY
Start: 2024-09-30

## 2024-10-08 NOTE — PROGRESS NOTES
Vascular Surgery Outpatient Progress Note      Chief Complaint   Patient presents with    Circulatory Problem     Follow up celiac artery aneurysm.       HISTORY OF PRESENT ILLNESS:                The patient is a 64 y.o. male who returns for follow-up evaluation of history of celiac artery aneurysm.  The patient only complains of some persistent swelling in his left leg and states that his wife is concerned about the discoloration.  He has a history of DVT in that leg.  He denies any new swelling.  He does state a history of bilateral varicose veins.  He denies any pain associated with the veins.  He denies any abdominal pain particularly postprandial.  He denies any weight loss.    Past Medical History:        Diagnosis Date    Blood clot in vein     Celiac artery aneurysm (HCC)     Knee pain     Leg pain     Pulmonary embolism (HCC)     TMJ (dislocation of temporomandibular joint)      Past Surgical History:        Procedure Laterality Date    BACK SURGERY      COLONOSCOPY  03/09/2015    NORMAL- JEROMY    FINGER SURGERY       Current Medications:   Prior to Admission medications    Medication Sig Start Date End Date Taking? Authorizing Provider   atorvastatin (LIPITOR) 20 MG tablet Take 1 tablet by mouth daily 9/30/24  Yes Radha Rosas MD   tiZANidine (ZANAFLEX) 2 MG tablet Take 1 tablet by mouth 3 times daily as needed (pain) 10/8/21  Yes Ramesh Peters MD   acetaminophen (TYLENOL) 500 MG tablet Take 1 tablet by mouth every 6 hours as needed for Pain   Yes Radha Rosas MD   rivaroxaban (XARELTO) 20 MG TABS tablet Take 1 tablet by mouth daily (with breakfast) 9/8/21  Yes Ramesh Peters MD   Psyllium (METAMUCIL PO) Take 1 tablet by mouth as needed.   Yes Radha Rosas MD     Allergies:  Patient has no known allergies.    Social History     Socioeconomic History    Marital status:      Spouse name: Not on file    Number of children: Not on file    Years of education: Not on file